# Patient Record
Sex: FEMALE | Race: ASIAN | NOT HISPANIC OR LATINO | URBAN - METROPOLITAN AREA
[De-identification: names, ages, dates, MRNs, and addresses within clinical notes are randomized per-mention and may not be internally consistent; named-entity substitution may affect disease eponyms.]

---

## 2022-02-23 ENCOUNTER — OFFICE (OUTPATIENT)
Dept: URBAN - METROPOLITAN AREA CLINIC 19 | Facility: CLINIC | Age: 37
End: 2022-02-23

## 2022-02-23 VITALS
WEIGHT: 125 LBS | HEART RATE: 66 BPM | OXYGEN SATURATION: 100 % | DIASTOLIC BLOOD PRESSURE: 63 MMHG | HEIGHT: 63 IN | SYSTOLIC BLOOD PRESSURE: 104 MMHG

## 2022-02-23 DIAGNOSIS — R14.1 GAS PAIN: ICD-10-CM

## 2022-02-23 DIAGNOSIS — R19.4 CHANGE IN BOWEL HABIT: ICD-10-CM

## 2022-02-23 DIAGNOSIS — K58.1 IRRITABLE BOWEL SYNDROME WITH CONSTIPATION: ICD-10-CM

## 2022-02-23 LAB
C-REACTIVE PROTEIN, QUANT: <1 MG/L
CBC, PLATELET, NO DIFFERENTIAL: HEMATOCRIT: 38.9 % (ref 34–46.6)
CBC, PLATELET, NO DIFFERENTIAL: HEMOGLOBIN: 12.5 G/DL (ref 11.1–15.9)
CBC, PLATELET, NO DIFFERENTIAL: MCH: 30 PG (ref 26.6–33)
CBC, PLATELET, NO DIFFERENTIAL: MCHC: 32.1 G/DL (ref 31.5–35.7)
CBC, PLATELET, NO DIFFERENTIAL: MCV: 94 FL (ref 79–97)
CBC, PLATELET, NO DIFFERENTIAL: PLATELETS: 268 X10E3/UL (ref 150–450)
CBC, PLATELET, NO DIFFERENTIAL: RBC: 4.16 X10E6/UL (ref 3.77–5.28)
CBC, PLATELET, NO DIFFERENTIAL: RDW: 12.7 % (ref 11.7–15.4)
CBC, PLATELET, NO DIFFERENTIAL: WBC: 3.5 X10E3/UL (ref 3.4–10.8)
CELIAC AB TTG DGP TIGA: DEAMIDATED GLIADIN ABS, IGA: 3 UNITS (ref 0–19)
CELIAC AB TTG DGP TIGA: DEAMIDATED GLIADIN ABS, IGG: 1 UNITS (ref 0–19)
CELIAC AB TTG DGP TIGA: IMMUNOGLOBULIN A, QN, SERUM: 181 MG/DL (ref 87–352)
CELIAC AB TTG DGP TIGA: T-TRANSGLUTAMINASE (TTG) IGA: <2 U/ML
CELIAC AB TTG DGP TIGA: T-TRANSGLUTAMINASE (TTG) IGG: <2 U/ML
COMP. METABOLIC PANEL (14): A/G RATIO: 2.1 (ref 1.2–2.2)
COMP. METABOLIC PANEL (14): ALBUMIN: 4.7 G/DL (ref 3.8–4.8)
COMP. METABOLIC PANEL (14): ALKALINE PHOSPHATASE: 44 IU/L (ref 44–121)
COMP. METABOLIC PANEL (14): ALT (SGPT): 15 IU/L (ref 0–32)
COMP. METABOLIC PANEL (14): AST (SGOT): 14 IU/L (ref 0–40)
COMP. METABOLIC PANEL (14): BILIRUBIN, TOTAL: 0.3 MG/DL (ref 0–1.2)
COMP. METABOLIC PANEL (14): BUN/CREATININE RATIO: 18 (ref 9–23)
COMP. METABOLIC PANEL (14): BUN: 12 MG/DL (ref 6–20)
COMP. METABOLIC PANEL (14): CALCIUM: 9.2 MG/DL (ref 8.7–10.2)
COMP. METABOLIC PANEL (14): CARBON DIOXIDE, TOTAL: 23 MMOL/L (ref 20–29)
COMP. METABOLIC PANEL (14): CHLORIDE: 104 MMOL/L (ref 96–106)
COMP. METABOLIC PANEL (14): CREATININE: 0.68 MG/DL (ref 0.57–1)
COMP. METABOLIC PANEL (14): EGFR IF AFRICN AM: 130 ML/MIN/1.73 (ref 59–?)
COMP. METABOLIC PANEL (14): EGFR IF NONAFRICN AM: 113 ML/MIN/1.73 (ref 59–?)
COMP. METABOLIC PANEL (14): GLOBULIN, TOTAL: 2.2 G/DL (ref 1.5–4.5)
COMP. METABOLIC PANEL (14): GLUCOSE: 81 MG/DL (ref 65–99)
COMP. METABOLIC PANEL (14): POTASSIUM: 4.1 MMOL/L (ref 3.5–5.2)
COMP. METABOLIC PANEL (14): PROTEIN, TOTAL: 6.9 G/DL (ref 6–8.5)
COMP. METABOLIC PANEL (14): SODIUM: 144 MMOL/L (ref 134–144)
HCG,BETA SUBUNIT, QNT: HCG,BETA SUBUNIT,QNT,SERUM: <1 MIU/ML
SEDIMENTATION RATE-WESTERGREN: 2 MM/HR (ref 0–32)
THYROXINE (T4) FREE, DIRECT: T4,FREE(DIRECT): 1.33 NG/DL (ref 0.82–1.77)
TSH: 1.28 UIU/ML (ref 0.45–4.5)

## 2022-02-23 PROCEDURE — 99204 OFFICE O/P NEW MOD 45 MIN: CPT

## 2022-02-23 RX ORDER — PLECANATIDE 3 MG/1
3 TABLET ORAL
Qty: 90 | Refills: 3 | Status: ACTIVE
Start: 2022-02-23

## 2022-02-23 NOTE — SERVICEHPINOTES
36-year-old  female from [place] here for complaints of worsening chronic constipation, increased gas and fecal urgency.  She has had constipation for over 10 years that she had previously managed with diet and occasional over-the-counter laxatives (i.e. MiraLax ) and stool softeners.  Over the last several weeks her symptoms have worsened and she has gone several days without bowel movements.  She has also had increased abdominal pain and gas.  She is also experiencing fecal urgency, but often is unable to release any stool or it will be liquid stool.  She apparently had a colonoscopy in [place] around 5 years ago that was reportedly unremarkable.  She was told though that she had an "abnormally shaped colon that causes my constipation."  She denies any other GI symptoms, including reflux, heartburn, nausea, vomiting, dysphagia or overt GI bleeding.  She has a grandmother with colon cancer at 73.

## 2022-02-23 NOTE — SERVICENOTES
The patient's assessment was reviewed with Dr. Mendoza and a collaborative plan of care was established.

## 2022-05-26 ENCOUNTER — OFFICE (OUTPATIENT)
Dept: URBAN - METROPOLITAN AREA PATHOLOGY 22 | Facility: PATHOLOGY | Age: 37
End: 2022-05-26
Payer: COMMERCIAL

## 2022-05-26 ENCOUNTER — AMBULATORY SURGICAL CENTER (OUTPATIENT)
Dept: URBAN - METROPOLITAN AREA SURGERY 3 | Facility: SURGERY | Age: 37
End: 2022-05-26
Payer: COMMERCIAL

## 2022-05-26 VITALS
DIASTOLIC BLOOD PRESSURE: 71 MMHG | SYSTOLIC BLOOD PRESSURE: 104 MMHG | RESPIRATION RATE: 17 BRPM | SYSTOLIC BLOOD PRESSURE: 104 MMHG | RESPIRATION RATE: 16 BRPM | SYSTOLIC BLOOD PRESSURE: 105 MMHG | DIASTOLIC BLOOD PRESSURE: 68 MMHG | RESPIRATION RATE: 15 BRPM | DIASTOLIC BLOOD PRESSURE: 66 MMHG | RESPIRATION RATE: 18 BRPM | HEART RATE: 68 BPM | DIASTOLIC BLOOD PRESSURE: 62 MMHG | SYSTOLIC BLOOD PRESSURE: 106 MMHG | HEART RATE: 69 BPM | HEART RATE: 72 BPM | OXYGEN SATURATION: 96 % | TEMPERATURE: 97.4 F | SYSTOLIC BLOOD PRESSURE: 93 MMHG | RESPIRATION RATE: 18 BRPM | HEART RATE: 62 BPM | DIASTOLIC BLOOD PRESSURE: 66 MMHG | DIASTOLIC BLOOD PRESSURE: 68 MMHG | OXYGEN SATURATION: 95 % | RESPIRATION RATE: 15 BRPM | TEMPERATURE: 97.9 F | DIASTOLIC BLOOD PRESSURE: 66 MMHG | RESPIRATION RATE: 17 BRPM | TEMPERATURE: 97.4 F | OXYGEN SATURATION: 95 % | RESPIRATION RATE: 16 BRPM | DIASTOLIC BLOOD PRESSURE: 68 MMHG | SYSTOLIC BLOOD PRESSURE: 93 MMHG | OXYGEN SATURATION: 95 % | SYSTOLIC BLOOD PRESSURE: 104 MMHG | SYSTOLIC BLOOD PRESSURE: 93 MMHG | HEART RATE: 72 BPM | HEART RATE: 68 BPM | HEART RATE: 62 BPM | OXYGEN SATURATION: 96 % | SYSTOLIC BLOOD PRESSURE: 106 MMHG | HEIGHT: 63 IN | TEMPERATURE: 97.4 F | HEART RATE: 62 BPM | SYSTOLIC BLOOD PRESSURE: 103 MMHG | HEIGHT: 63 IN | RESPIRATION RATE: 15 BRPM | SYSTOLIC BLOOD PRESSURE: 105 MMHG | TEMPERATURE: 97.9 F | WEIGHT: 120 LBS | WEIGHT: 120 LBS | HEART RATE: 72 BPM | DIASTOLIC BLOOD PRESSURE: 62 MMHG | RESPIRATION RATE: 16 BRPM | DIASTOLIC BLOOD PRESSURE: 62 MMHG | SYSTOLIC BLOOD PRESSURE: 105 MMHG | RESPIRATION RATE: 18 BRPM | DIASTOLIC BLOOD PRESSURE: 71 MMHG | OXYGEN SATURATION: 96 % | DIASTOLIC BLOOD PRESSURE: 71 MMHG | HEIGHT: 63 IN | HEART RATE: 68 BPM | HEART RATE: 69 BPM | SYSTOLIC BLOOD PRESSURE: 103 MMHG | RESPIRATION RATE: 17 BRPM | WEIGHT: 120 LBS | TEMPERATURE: 97.9 F | SYSTOLIC BLOOD PRESSURE: 103 MMHG | SYSTOLIC BLOOD PRESSURE: 106 MMHG | HEART RATE: 69 BPM

## 2022-05-26 DIAGNOSIS — K59.00 CONSTIPATION, UNSPECIFIED: ICD-10-CM

## 2022-05-26 DIAGNOSIS — K63.89 OTHER SPECIFIED DISEASES OF INTESTINE: ICD-10-CM

## 2022-05-26 PROCEDURE — 88342 IMHCHEM/IMCYTCHM 1ST ANTB: CPT | Performed by: STUDENT IN AN ORGANIZED HEALTH CARE EDUCATION/TRAINING PROGRAM

## 2022-05-26 PROCEDURE — 45380 COLONOSCOPY AND BIOPSY: CPT | Performed by: INTERNAL MEDICINE

## 2022-05-26 PROCEDURE — 88305 TISSUE EXAM BY PATHOLOGIST: CPT | Performed by: STUDENT IN AN ORGANIZED HEALTH CARE EDUCATION/TRAINING PROGRAM
